# Patient Record
Sex: FEMALE | Race: BLACK OR AFRICAN AMERICAN | NOT HISPANIC OR LATINO
[De-identification: names, ages, dates, MRNs, and addresses within clinical notes are randomized per-mention and may not be internally consistent; named-entity substitution may affect disease eponyms.]

---
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
## 2023-11-15 NOTE — ED PROVIDER NOTE - NS HIV RISK FACTOR YES
This note is specifically for wellness visit performed today.   WELLNESS EXAM    Patient ID: Karlos Clark is a 38 y.o. male.  has a past medical history of Benign essential hypertension (2/20/2013), Depression (12/7/2011), Encounter for long-term (current) use of medications (7/23/2019), GERD (gastroesophageal reflux disease), Hypercholesterolemia (2/20/2013), Hypertension (12/7/2011), Hypotestosteronemia (8/15/2019), Obesity (12/7/2011), and Vitamin D deficiency (8/15/2019).   Chief Complaint:  Encounter for wellness exam    Well Adult Physical: Patient here for a comprehensive physical exam.The patient reports chronic problems.  The patient's last visit with me was on 2/3/2023.    November 2023 patient reports that he is doing well.  Reviewed labs from external.  Recent tonsillectomy.  Doing well sleeping better.  Here for medication refills.  CHRONIC. Stable. Compliant with medications for managed conditions. See medication list. No SE reported.   Routine lab analysis is being monitored. Refills were addressed.  Lab Results   Component Value Date    WBC 4.8 11/29/2022    HGB 13.7 11/29/2022    HCT 40.7 11/29/2022    MCV 86.4 11/29/2022     (L) 11/29/2022         Chemistry        Component Value Date/Time     11/29/2022 0801    K 3.4 (L) 11/29/2022 0801     11/29/2022 0801    CO2 27 11/29/2022 0801    BUN 13 11/29/2022 0801    CREATININE 0.72 11/29/2022 0801    GLU 87 11/29/2022 0801        Component Value Date/Time    CALCIUM 8.6 11/29/2022 0801    ALKPHOS 55 10/24/2021 1210    ALKPHOS 67 04/30/2020 0827    AST 17 10/03/2023 0709    ALT 22 10/03/2023 0709    BILITOT 0.7 10/03/2023 0709    ESTGFRAFRICA 121 01/29/2021 0739    EGFRNONAA 105 01/29/2021 0739          Lab Results   Component Value Date    TSH 1.13 11/29/2022    I0XGXMA 7.8 07/24/2019    FREET4 0.79 01/20/2020    T3FREE 3.6 07/24/2019         Reviewed Care team:Cardiology Dr. Samy A [facility]  On TR T. Check labs every six  months.  ENT Darrin Ravi  Urology Barbara Hinkle NP    September 2022:  Patient reports that he is doing well since our last visit.  He is now working for the [employer].     Patient is concerned about a nodule that was supposedly seen on a chest x-ray at Urgent Care.  Patient has not followed since then.  Patient has seen ENT for sinus issues.    Atrial fibrillation: Follows with Cardiology.        Hypertension:  November 2023:  Hypertension Medications               amLODIPine (NORVASC) 10 MG tablet Take 1 tablet (10 mg total) by mouth once daily.    benazepril-hydrochlorthiazide (LOTENSIN HCT) 20-25 mg Tab Take 1 tablet by mouth once daily.    metoprolol tartrate (LOPRESSOR) 100 MG tablet Take 1 tablet (100 mg total) by mouth 2 (two) times daily.            CHRONIC. STABLE. BP Reviewed.  Compliant with BP medications. No SE reported.   (-) CP, SOB, palpitations, dizziness, lightheadedness, HA, arm numbness, tingling or weakness, syncope.  Creatinine   Date Value Ref Range Status   11/29/2022 0.72 0.60 - 1.26 mg/dL Final      hyperlipidemia:  Hyperlipidemia Medications               atorvastatin (LIPITOR) 40 MG tablet Take 1 tablet (40 mg total) by mouth once daily.            CHRONIC. STABLE. Lab analysis reviewed.   (-) CP, SOB, abdominal pain, N/V/D, constipation, jaundice, skin changes.  (-) Myalgias  Lab Results   Component Value Date    CHOL 112 11/29/2022    CHOL 129 01/29/2021    CHOL 133 04/30/2020     Lab Results   Component Value Date    HDL 27 (L) 11/29/2022    HDL 36 (L) 01/29/2021    HDL 34 (L) 04/30/2020     Lab Results   Component Value Date    LDLCALC 61 11/29/2022    LDLCALC 74 01/29/2021    LDLCALC 77.8 04/30/2020     Lab Results   Component Value Date    TRIG 161 (H) 11/29/2022    TRIG 111 01/29/2021    TRIG 106 04/30/2020     Lab Results   Component Value Date    CHOLHDL 4.1 11/29/2022    CHOLHDL 3.6 01/29/2021    CHOLHDL 25.6 04/30/2020     Lab Results   Component Value Date    TOTALCHOLEST  3.9 04/30/2020    TOTALCHOLEST 4.7 01/10/2020    TOTALCHOLEST 4.9 09/06/2018     Lab Results   Component Value Date    ALT 22 10/03/2023    AST 17 10/03/2023    ALKPHOS 55 10/24/2021    BILITOT 0.7 10/03/2023     ======================================================  GERD:  Chronic.  Stable.  Compliant with pantoprazole 40 milligrams daily.  No side effects reported.  Mood:  Chronic.  Stable.  Compliant with Zoloft 100 milligrams daily.    Do you take any herbs or supplements that were not prescribed by a doctor? no   Are you taking calcium supplements? no      History:  Previously on  Hypogonadism on TRT UROLOGIST:  None but would like to consult with Urology for further testosterone placement therapy.  Patient has been having issues with self injections into the gluteal region with bruising and swelling.    Date last PSA: The natural history of prostate cancer and ongoing controversy regarding screening and potential treatment outcomes of prostate cancer has been discussed with the patient. The meaning of a false positive PSA and a false negative PSA has been discussed. He indicates understanding of the limitations of this screening test and wishes  to proceed with screening PSA testing.    Lab Results   Component Value Date    PSA 0.95 01/10/2020      Lab Results   Component Value Date    TESTOSTERONE 258 08/03/2021      Testosterone (ng/dL)   Date Value   07/24/2019 221 (L)     TESTOSTERONE, TOTAL, MALE (ng/dL)   Date Value   08/03/2021 258     Testosterone, Total (ng/dL)   Date Value   04/30/2020 304      Colon cancer screening:  Not indicated  Health Maintenance Topics with due status: Not Due       Topic Last Completion Date    TETANUS VACCINE 09/09/2022    Hemoglobin A1c (Diabetic Prevention Screening) 11/29/2022    Lipid Panel 11/29/2022      ==============================================  History reviewed.   Health Maintenance Due   Topic Date Due    Influenza Vaccine (1) 09/01/2023    COVID-19 Vaccine  (3 - 2023-24 season) 09/01/2023       Past Medical History:  Past Medical History:   Diagnosis Date    Benign essential hypertension 2/20/2013    Depression 12/7/2011    Encounter for long-term (current) use of medications 7/23/2019 07/23/2019  Patient is on CHRONIC long-term drug therapy for managed conditions. See medication list. Reports compliance.  No side effects reported.  Routine lab work is being monitored.  Patient does  need refills today.   Lab Results Component Value Date  WBC 6.34 09/06/2018  HGB 14.1 09/06/2018  HCT 42.6 09/06/2018  MCV 86 09/06/2018   09/06/2018   CMP Sodium Date Value Ref Range Status     GERD (gastroesophageal reflux disease)     Hypercholesterolemia 2/20/2013    Chronic.  Uncontrolled.  Patient not on medications currently.  Patient has been on lovastatin in the past.  Not adherent to diet.  Lab Results Component Value Date  CHOL 213 (H) 07/24/2019  CHOL 162 09/06/2018  CHOL 181 04/20/2018  Lab Results Component Value Date  HDL 43 07/24/2019  HDL 33 (L) 09/06/2018  HDL 37 (L) 04/20/2018  Lab Results Component Value Date  LDLCALC 139 (H) 07/24/2019  LDLCAL    Hypertension 12/7/2011    Hypotestosteronemia 8/15/2019    Chronic .  Untreated. Lab Results Component Value Date  TESTOSTERONE 221 (L) 07/24/2019  Patient interested in starting replacement.    Obesity 12/7/2011    Vitamin D deficiency 8/15/2019    Chronic.  Untreated.  Patient taking vitamin-D supplementation.  Last vitamin-D level was low.     Past Surgical History:   Procedure Laterality Date    bladder      Pt was having bladder control problems.  Bladder was stretched approx 7 yrs old    SINUS SURGERY       Review of patient's allergies indicates:   Allergen Reactions    Escitalopram oxalate Other (See Comments) and Palpitations     Current Outpatient Medications on File Prior to Visit   Medication Sig Dispense Refill    aspirin (ECOTRIN) 325 MG EC tablet       cetirizine (ZYRTEC) 10 MG tablet Take 10 mg by  mouth once daily.      famotidine (PEPCID) 20 MG tablet       fluticasone propionate (FLONASE) 50 mcg/actuation nasal spray 1 spray by Each Nostril route once daily.      [DISCONTINUED] amLODIPine (NORVASC) 10 MG tablet Take 1 tablet by mouth once daily 90 tablet 1    [DISCONTINUED] atorvastatin (LIPITOR) 40 MG tablet Take 1 tablet by mouth once daily 90 tablet 0    [DISCONTINUED] azelastine (ASTELIN) 137 mcg (0.1 %) nasal spray Use 1 spray(s) in each nostril twice daily 90 mL 1    [DISCONTINUED] benazepril-hydrochlorthiazide (LOTENSIN HCT) 20-25 mg Tab Take 1 tablet by mouth once daily 30 tablet 0    [DISCONTINUED] metoprolol tartrate (LOPRESSOR) 100 MG tablet Take 1 tablet by mouth twice daily 180 tablet 1    [DISCONTINUED] pantoprazole (PROTONIX) 40 MG tablet Take 1 tablet (40 mg total) by mouth once daily. 90 tablet 4    [DISCONTINUED] sertraline (ZOLOFT) 100 MG tablet Take 1 tablet by mouth once daily 90 tablet 1    albuterol (PROVENTIL/VENTOLIN HFA) 90 mcg/actuation inhaler Inhale 2 puffs into the lungs every 4 (four) hours as needed.      ibuprofen (ADVIL,MOTRIN) 800 MG tablet Take 800 mg by mouth every 8 (eight) hours as needed.      methylPREDNISolone (MEDROL DOSEPACK) 4 mg tablet follow package directions (Patient not taking: Reported on 11/15/2023) 21 tablet 0     No current facility-administered medications on file prior to visit.     Social History     Socioeconomic History    Marital status: Single   Tobacco Use    Smoking status: Never    Smokeless tobacco: Never   Substance and Sexual Activity    Alcohol use: Yes     Alcohol/week: 4.0 standard drinks of alcohol     Types: 4 Drinks containing 0.5 oz of alcohol per week     Comment: 2 drinks per month    Drug use: No    Sexual activity: Not Currently     Partners: Female     Birth control/protection: Condom     Social Determinants of Health     Financial Resource Strain: Low Risk  (11/8/2023)    Overall Financial Resource Strain (CARDIA)     Difficulty  of Paying Living Expenses: Not very hard   Food Insecurity: No Food Insecurity (11/8/2023)    Hunger Vital Sign     Worried About Running Out of Food in the Last Year: Never true     Ran Out of Food in the Last Year: Never true   Transportation Needs: No Transportation Needs (11/8/2023)    PRAPARE - Transportation     Lack of Transportation (Medical): No     Lack of Transportation (Non-Medical): No   Physical Activity: Insufficiently Active (11/8/2023)    Exercise Vital Sign     Days of Exercise per Week: 3 days     Minutes of Exercise per Session: 30 min   Stress: No Stress Concern Present (11/8/2023)    [place] [employer] - Occupational Stress Questionnaire     Feeling of Stress : Only a little   Social Connections: Unknown (11/8/2023)    Social Connection and Isolation Panel [NHANES]     Frequency of Communication with Friends and Family: More than three times a week     Frequency of Social Gatherings with Friends and Family: Twice a week     Active Member of Clubs or Organizations: No     Attends Club or Organization Meetings: Never     Marital Status: Never    Housing Stability: Low Risk  (11/8/2023)    Housing Stability Vital Sign     Unable to Pay for Housing in the Last Year: No     Number of Places Lived in the Last Year: 1     Unstable Housing in the Last Year: No     Family History   Problem Relation Age of Onset    Hypertension Mother     Arthritis Mother     Depression Mother     Hyperlipidemia Mother     COPD Father     Hypertension Father     Alcohol abuse Father     Arthritis Father     Cancer Father         Prostate cancer    Depression Father     Hearing loss Father     Heart disease Father     Hyperlipidemia Father     No Known Problems Sister     Arthritis Maternal Grandfather     Cancer Maternal Grandfather         Prostate cancer    Depression Maternal Grandfather     Diabetes Maternal Grandfather     Heart disease Maternal Grandfather     Hyperlipidemia Maternal  Grandfather     Hypertension Maternal Grandfather     Kidney disease Maternal Grandfather     Stroke Maternal Grandfather     Arthritis Paternal Grandmother     Depression Paternal Grandmother     Heart disease Paternal Grandmother     Hyperlipidemia Paternal Grandmother     Hypertension Paternal Grandmother     COPD Paternal Grandfather         Prostate cancer    Depression Paternal Grandfather     Allergic rhinitis Neg Hx     Allergies Neg Hx     Angioedema Neg Hx     Asthma Neg Hx     Atopy Neg Hx     Eczema Neg Hx     Immunodeficiency Neg Hx     Rhinitis Neg Hx     Urticaria Neg Hx     Glaucoma Neg Hx        Review of Systems   Constitutional:  Negative for activity change and unexpected weight change.   HENT:  Negative for hearing loss, rhinorrhea and trouble swallowing.    Eyes:  Negative for discharge and visual disturbance.   Respiratory:  Negative for chest tightness and wheezing.    Cardiovascular:  Negative for chest pain and palpitations.   Gastrointestinal:  Negative for blood in stool, constipation, diarrhea and vomiting.   Endocrine: Negative for polydipsia and polyuria.   Genitourinary:  Negative for difficulty urinating, hematuria and urgency.   Musculoskeletal:  Negative for arthralgias, joint swelling and neck pain.   Neurological:  Negative for weakness and headaches.   Psychiatric/Behavioral:  Negative for confusion and dysphoric mood.         Objective:     Vitals:    11/15/23 1524   BP: 100/69   Pulse: 79    Body mass index is 44.74 kg/m².  Physical Exam  Vitals and nursing note reviewed.   Constitutional:       General: He is not in acute distress.     Appearance: He is well-developed. He is not diaphoretic.   HENT:      Head: Normocephalic and atraumatic.      Right Ear: External ear normal.      Left Ear: External ear normal.      Nose: Nose normal. No rhinorrhea.   Eyes:      Extraocular Movements: Extraocular movements intact.      Pupils: Pupils are equal, round, and reactive to light.    Cardiovascular:      Rate and Rhythm: Normal rate.      Pulses: Normal pulses.   Pulmonary:      Effort: Pulmonary effort is normal. No respiratory distress.      Breath sounds: Normal breath sounds.   Chest:          Comments: Small mobile rubbery soft tissue mass  Abdominal:      General: Bowel sounds are normal.      Palpations: Abdomen is soft.   Musculoskeletal:         General: Normal range of motion.      Cervical back: Normal range of motion and neck supple.   Skin:     General: Skin is warm and dry.      Capillary Refill: Capillary refill takes less than 2 seconds.      Findings: No rash.   Neurological:      General: No focal deficit present.      Mental Status: He is alert and oriented to person, place, and time. Mental status is at baseline.      Cranial Nerves: No cranial nerve deficit.      Motor: No weakness.      Gait: Gait normal.   Psychiatric:         Attention and Perception: He is attentive.         Mood and Affect: Mood normal. Mood is not anxious or depressed. Affect is not labile, blunt, angry or inappropriate.         Speech: He is communicative. Speech is not rapid and pressured, delayed, slurred or tangential.         Behavior: Behavior normal. Behavior is not agitated, slowed, aggressive, withdrawn, hyperactive or combative.         Thought Content: Thought content normal. Thought content is not paranoid or delusional. Thought content does not include homicidal or suicidal ideation. Thought content does not include homicidal or suicidal plan.         Cognition and Memory: Memory is not impaired.         Judgment: Judgment normal. Judgment is not impulsive or inappropriate.          Office Visit on 12/02/2022   Component Date Value Ref Range Status    Total Protein 10/03/2023 6.6  6.1 - 8.1 g/dL Final    Albumin 10/03/2023 4.2  3.6 - 5.1 g/dL Final    Globulin, Total 10/03/2023 2.4  1.9 - 3.7 g/dL (calc) Final    Albumin/Globulin Ratio 10/03/2023 1.8  1.0 - 2.5 (calc) Final    Total  Bilirubin 10/03/2023 0.7  0.2 - 1.2 mg/dL Final    Bilirubin, Direct 10/03/2023 0.1  < OR = 0.2 mg/dL Final    Bilirubin, Indirect 10/03/2023 0.6  0.2 - 1.2 mg/dL (calc) Final    Alkaline Phosphatase 10/03/2023 64  36 - 130 U/L Final    AST 10/03/2023 17  10 - 40 U/L Final    ALT 10/03/2023 22  9 - 46 U/L Final      X-Ray Chest PA And Lateral  Narrative: EXAMINATION:  XR CHEST PA AND LATERAL    CLINICAL HISTORY:  Bronchitis, not specified as acute or chronic    TECHNIQUE:  PA and lateral views of the chest were performed.    COMPARISON:  09/09/2022    FINDINGS:  The cardiac and mediastinal silhouettes appear within normal limits.   The lungs are clear bilaterally.  No acute osseous findings demonstrated.  Impression: No acute findings.    Electronically signed by: Jarrod Krause MD  Date:    02/03/2023  Time:    13:35    Assessment / Plan:    1.  Routine health exam-patient here for annual wellness exam.  Labs ordered.  Health maintenance was reviewed and ordered.  Anticipatory guidance: Don't smoke.  Healthy diet and regular exercise recommended. Vaccine recommendations discussed.  See orders.  Reviewed Anticipatory guidance, risk factor reduction interventions or counseling as needed, Complete history , physical was completed today.  Complete and thorough medication reconciliation was performed.  Discussed risks and benefits of medications.  Advised patient on orders and health maintenance.  We discussed old records and old labs if available.  Will request any records not available through epic.  Continue current medications listed on your summary sheet.  All questions were answered. Patient had no further concerns. Advised of diagnoses and plan. Follow up as planned or return sooner if symptoms persist or worsen.     Testosterone replacement therapy:  Follow-up with Urology.  Referral placed to Urology for further evaluation and management.  Discussed risks and benefits of testosterone replacement  therapy.  Hypertension:  Continue current regimen.  Counseled on importance of hypertension disease course, I recommend ongoing Education for DASH-diet and exercise.  Counseled on medication regimen importance of treating high blood pressure.  Please be advised of risk of untreated blood pressure as discussed.  Please advised of ER precautions were given for symptoms of hypertensive urgency and emergency.  Hyperlipidemia:  Continue atorvastatin.  Counseled on hyperlipidemia disease course, healthy diet and increased need for exercise.  Please be advised of the risk of cardiovascular disease, increase stroke and heart attack risk with uncontrolled/untreated hyperlipidemia.     Patient voiced understanding and understood the treatment plan. All questions were answered.   GERD RECOMMENDATIONS  Please be advised of condition course.  - Take PPI in the morning 30-60 minutes before breakfast  - I recommend ongoing Education for lifestyle modifications to help control/reduce reflux/abdominal pain including: avoid large meals, avoid eating within 2-3 hours of bedtime (avoid late night eating & lying down soon after eating), elevate head of bed if nocturnal symptoms are present, smoking cessation (if current smoker), & weight loss (if overweight).   - please be advised to avoid known foods which trigger reflux symptoms & to minimize/avoid high-fat foods, chocolate, caffeine, citrus, alcohol, & tomato products.  - Advised to avoid/limit use of NSAID's, since they can cause GI upset, bleeding, and/or ulcers. If needed, take with food.   Depression: Continue Zoloft 100 milligram.  Please be advised of condition course.  SNRI/SSRI is first-line treatment for this condition.  Please be advised of the risk of discontinuing this medication without tapering/contacting MD.  Patient has been advised of side effects, and all questions were answered.  Patient voiced understanding.  Patient will follow up routinely and notify us if  having any side effects or worsening or persistent symptoms.  ER precautions were given. Antidepressant/Antianxiety Medication Initiation:  Patient informed of risks, benefits, and potential side effects of medication and accepts informed consent.  Common side effects include nausea, fatigue, headache, insomnia, etc see medication insert for complete side effect profile.  Most importantly be advised of the possibility of new or worsening suicidal thoughts/depression/anxiety etcetera.  Please be advised to stop the medication immediately and seek urgent treatment if this occurs.  Therefore please do not to abruptly discontinue medication without physician guidance except in cases of sudden onset or worsening of SI.   BMI improving with weight loss.    Pulmonary nodule:  Chest x-ray negative.  Patient would like to start with chest x-ray which I believe is reasonable.  Requesting records from urgent care I do not have previous imaging.  If any symptoms or concerns will get CT of the chest.  Patient consents to influenza vaccination.  Discussed risk benefits.  Orders Placed This Encounter   Procedures    CBC Without Differential     Standing Status:   Future     Standing Expiration Date:   1/13/2025    Comprehensive Metabolic Panel     Standing Status:   Future     Standing Expiration Date:   1/13/2025    TSH     Standing Status:   Future     Standing Expiration Date:   1/13/2025    Hemoglobin A1C     Standing Status:   Future     Standing Expiration Date:   1/13/2025    Lipid Panel     Standing Status:   Future     Standing Expiration Date:   1/13/2025       Medications Ordered This Encounter   Medications    amLODIPine (NORVASC) 10 MG tablet     Sig: Take 1 tablet (10 mg total) by mouth once daily.     Dispense:  90 tablet     Refill:  4     .    atorvastatin (LIPITOR) 40 MG tablet     Sig: Take 1 tablet (40 mg total) by mouth once daily.     Dispense:  90 tablet     Refill:  4    azelastine (ASTELIN) 137 mcg (0.1 %)  nasal spray     Si spray (137 mcg total) by Nasal route 2 (two) times daily.     Dispense:  90 mL     Refill:  4    benazepril-hydrochlorthiazide (LOTENSIN HCT) 20-25 mg Tab     Sig: Take 1 tablet by mouth once daily.     Dispense:  90 tablet     Refill:  4    metoprolol tartrate (LOPRESSOR) 100 MG tablet     Sig: Take 1 tablet (100 mg total) by mouth 2 (two) times daily.     Dispense:  180 tablet     Refill:  4     .    pantoprazole (PROTONIX) 40 MG tablet     Sig: Take 1 tablet (40 mg total) by mouth once daily.     Dispense:  90 tablet     Refill:  4     Please inactivate all prior scripts with same name and strength including on holds.    sertraline (ZOLOFT) 100 MG tablet     Sig: Take 1 tablet (100 mg total) by mouth once daily.     Dispense:  90 tablet     Refill:  4      Future Appointments       Date Provider Specialty Appt Notes    11/15/2023 Randal Keenan MD Family Medicine Annual           Randal Keenan MD           Declined

## 2024-07-22 ENCOUNTER — APPOINTMENT (OUTPATIENT)
Dept: NEUROLOGY | Facility: CLINIC | Age: 24
End: 2024-07-22
Payer: MEDICAID

## 2024-07-22 ENCOUNTER — NON-APPOINTMENT (OUTPATIENT)
Age: 24
End: 2024-07-22

## 2024-07-22 VITALS
BODY MASS INDEX: 32.86 KG/M2 | WEIGHT: 142 LBS | DIASTOLIC BLOOD PRESSURE: 76 MMHG | HEIGHT: 55 IN | SYSTOLIC BLOOD PRESSURE: 118 MMHG | HEART RATE: 84 BPM

## 2024-07-22 PROCEDURE — 95816 EEG AWAKE AND DROWSY: CPT

## 2024-07-22 PROCEDURE — 99203 OFFICE O/P NEW LOW 30 MIN: CPT

## 2024-07-22 PROCEDURE — G2211 COMPLEX E/M VISIT ADD ON: CPT | Mod: NC

## 2024-07-22 NOTE — HISTORY OF PRESENT ILLNESS
[FreeTextEntry1] : ***7/22/2024*** Ms Jose Blankenship is a 23 yo woman who is transitioning to us from Pediatric Neurology was seen by Al Cuevas last seen in 2018 2018 stopped keppra thought to be triggered by stress so she was taken off Keppra 7/4/2024 had witnessed episod taken [facility]; started on Keppra in the hospital and was told to follow up with neurology the following Monday at school episode of breathing erratic  put head down not responding lasted minute  Risk factor paternal   fathers first cousin No hx of CNS infection no hx of Head trauma one month old had a high fever spinal tap negative  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

## 2024-07-23 ENCOUNTER — NON-APPOINTMENT (OUTPATIENT)
Age: 24
End: 2024-07-23

## 2024-07-30 ENCOUNTER — OUTPATIENT (OUTPATIENT)
Dept: OUTPATIENT SERVICES | Facility: HOSPITAL | Age: 24
LOS: 1 days | End: 2024-07-30
Payer: MEDICAID

## 2024-07-30 ENCOUNTER — APPOINTMENT (OUTPATIENT)
Dept: MRI IMAGING | Facility: CLINIC | Age: 24
End: 2024-07-30
Payer: MEDICAID

## 2024-07-30 ENCOUNTER — TRANSCRIPTION ENCOUNTER (OUTPATIENT)
Age: 24
End: 2024-07-30

## 2024-07-30 DIAGNOSIS — G40.909 EPILEPSY, UNSPECIFIED, NOT INTRACTABLE, WITHOUT STATUS EPILEPTICUS: ICD-10-CM

## 2024-07-30 PROCEDURE — 76377 3D RENDER W/INTRP POSTPROCES: CPT

## 2024-07-30 PROCEDURE — 70551 MRI BRAIN STEM W/O DYE: CPT | Mod: 26

## 2024-07-30 PROCEDURE — 70551 MRI BRAIN STEM W/O DYE: CPT

## 2024-07-30 PROCEDURE — 76377 3D RENDER W/INTRP POSTPROCES: CPT | Mod: 26

## 2024-08-23 ENCOUNTER — APPOINTMENT (OUTPATIENT)
Dept: NEUROLOGY | Facility: CLINIC | Age: 24
End: 2024-08-23

## 2024-08-23 PROCEDURE — 95816 EEG AWAKE AND DROWSY: CPT

## 2024-08-24 PROCEDURE — 95719 EEG PHYS/QHP EA INCR W/O VID: CPT

## 2024-08-24 PROCEDURE — 95700 EEG CONT REC W/VID EEG TECH: CPT

## 2024-08-24 PROCEDURE — 95708 EEG WO VID EA 12-26HR UNMNTR: CPT

## 2024-10-22 ENCOUNTER — APPOINTMENT (OUTPATIENT)
Dept: NEUROLOGY | Facility: CLINIC | Age: 24
End: 2024-10-22
Payer: MEDICAID

## 2024-10-22 ENCOUNTER — NON-APPOINTMENT (OUTPATIENT)
Age: 24
End: 2024-10-22

## 2024-10-22 VITALS
SYSTOLIC BLOOD PRESSURE: 114 MMHG | HEART RATE: 80 BPM | BODY MASS INDEX: 32.86 KG/M2 | WEIGHT: 142 LBS | DIASTOLIC BLOOD PRESSURE: 75 MMHG | HEIGHT: 55 IN

## 2024-10-22 DIAGNOSIS — G40.919 EPILEPSY, UNSPECIFIED, INTRACTABLE, W/OUT STATUS EPILEPTICUS: ICD-10-CM

## 2024-10-22 DIAGNOSIS — Z78.9 OTHER SPECIFIED HEALTH STATUS: ICD-10-CM

## 2024-10-22 PROCEDURE — 99205 OFFICE O/P NEW HI 60 MIN: CPT

## 2024-10-22 RX ORDER — ZONISAMIDE 100 MG/1
100 CAPSULE ORAL
Qty: 30 | Refills: 3 | Status: ACTIVE | COMMUNITY
Start: 2024-10-22 | End: 1900-01-01

## 2025-04-01 ENCOUNTER — APPOINTMENT (OUTPATIENT)
Dept: NEUROLOGY | Facility: CLINIC | Age: 25
End: 2025-04-01

## 2025-04-01 VITALS
DIASTOLIC BLOOD PRESSURE: 79 MMHG | HEART RATE: 84 BPM | BODY MASS INDEX: 32.62 KG/M2 | SYSTOLIC BLOOD PRESSURE: 125 MMHG | HEIGHT: 56 IN | WEIGHT: 145 LBS

## 2025-04-01 DIAGNOSIS — F88 OTHER DISORDERS OF PSYCHOLOGICAL DEVELOPMENT: ICD-10-CM

## 2025-04-01 DIAGNOSIS — G40.919 EPILEPSY, UNSPECIFIED, INTRACTABLE, W/OUT STATUS EPILEPTICUS: ICD-10-CM

## 2025-04-01 PROCEDURE — 99214 OFFICE O/P EST MOD 30 MIN: CPT

## 2025-07-01 ENCOUNTER — APPOINTMENT (OUTPATIENT)
Dept: NEUROLOGY | Facility: CLINIC | Age: 25
End: 2025-07-01

## 2025-07-15 ENCOUNTER — APPOINTMENT (OUTPATIENT)
Dept: NEUROLOGY | Facility: CLINIC | Age: 25
End: 2025-07-15

## 2025-07-15 VITALS
SYSTOLIC BLOOD PRESSURE: 120 MMHG | WEIGHT: 138 LBS | DIASTOLIC BLOOD PRESSURE: 77 MMHG | HEART RATE: 80 BPM | BODY MASS INDEX: 31.05 KG/M2 | HEIGHT: 56 IN

## 2025-07-15 PROCEDURE — 99214 OFFICE O/P EST MOD 30 MIN: CPT

## 2025-09-12 ENCOUNTER — APPOINTMENT (OUTPATIENT)
Dept: NEUROLOGY | Facility: CLINIC | Age: 25
End: 2025-09-12
Payer: MEDICAID

## 2025-09-12 PROCEDURE — 95816 EEG AWAKE AND DROWSY: CPT

## 2025-09-13 PROCEDURE — 95708 EEG WO VID EA 12-26HR UNMNTR: CPT

## 2025-09-13 PROCEDURE — 95719 EEG PHYS/QHP EA INCR W/O VID: CPT

## 2025-09-13 PROCEDURE — 95700 EEG CONT REC W/VID EEG TECH: CPT
